# Patient Record
Sex: FEMALE
[De-identification: names, ages, dates, MRNs, and addresses within clinical notes are randomized per-mention and may not be internally consistent; named-entity substitution may affect disease eponyms.]

---

## 2018-04-26 ENCOUNTER — HOSPITAL ENCOUNTER (EMERGENCY)
Dept: HOSPITAL 31 - C.ER | Age: 53
Discharge: HOME | End: 2018-04-26
Payer: MEDICAID

## 2018-04-26 VITALS
HEART RATE: 71 BPM | TEMPERATURE: 97.8 F | SYSTOLIC BLOOD PRESSURE: 175 MMHG | DIASTOLIC BLOOD PRESSURE: 93 MMHG | RESPIRATION RATE: 18 BRPM

## 2018-04-26 VITALS — OXYGEN SATURATION: 96 %

## 2018-04-26 VITALS — BODY MASS INDEX: 35.1 KG/M2

## 2018-04-26 DIAGNOSIS — T78.49XA: Primary | ICD-10-CM
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
## 2018-04-26 PROCEDURE — 96375 TX/PRO/DX INJ NEW DRUG ADDON: CPT

## 2018-04-26 PROCEDURE — 96361 HYDRATE IV INFUSION ADD-ON: CPT

## 2018-04-26 PROCEDURE — 99285 EMERGENCY DEPT VISIT HI MDM: CPT

## 2018-04-26 PROCEDURE — 96374 THER/PROPH/DIAG INJ IV PUSH: CPT

## 2018-04-26 NOTE — C.PDOC
Time Seen by Provider: 04/26/18 15:06


Chief Complaint (Nursing): Allergic Reaction


History Per: Patient


Onset/Duration Of Symptoms: Mins (Just PTA)


Current Symptoms Are (Timing): Still Present


Possible Cause: Food (Tuna)


Associated Symptoms: Skin Rash, Dyspnea, Itching, Redness


Home/EMS Treatment: None


Severity: Moderate


Additional History Per: Prior Records





Past Medical History


Reviewed: Historical Data, Nursing Documentation, Vital Signs


Vital Signs: 


 Last Vital Signs











Temp  97.8 F   04/26/18 19:24


 


Pulse  71   04/26/18 19:24


 


Resp  18   04/26/18 19:24


 


BP  175/93 H  04/26/18 19:24


 


Pulse Ox  100   04/26/18 19:24














- Medical History


PMH: Diabetes, HTN, Hypercholesterolemia


Surgical History: Cholecystectomy


Family History: States: Unknown Family Hx





- Social History


Hx Alcohol Use: No


Hx Substance Use: No





- Immunization History


Hx Tetanus Toxoid Vaccination: No


Hx Influenza Vaccination: No


Hx Pneumococcal Vaccination: No





Review Of Systems


Except As Marked, All Systems Reviewed And Found Negative.


Constitutional: Negative for: Fever


Eyes: Negative for: Conjunctivae Inflammation


ENT: Positive for: Throat Swelling (mild)


Cardiovascular: Negative for: Chest Pain


Respiratory: Negative for: Shortness of Breath


Gastrointestinal: Negative for: Vomiting, Abdominal Pain


Musculoskeletal: Negative for: Neck Pain


Skin: Positive for: Rash


Neurological: Negative for: Weakness, Numbness





Physical Exam





- Physical Exam


Appears: Non-toxic, No Acute Distress


Skin: Warm, Dry, Rash (Urticaria)


Head: Atraumatic, Normacephalic


Eye(s): bilateral: Normal Inspection, PERRL, EOMI


Oral Mucosa: Moist, No Drooling, No Trismus


Tongue: Normal Appearing


Throat: Normal


Neck: Normal ROM, Supple


Cardiovascular: Rhythm Regular


Respiratory: Normal Breath Sounds, No Accessory Muscle Use


Gastrointestinal/Abdominal: Soft, No Tenderness


Back: No CVA Tenderness


Extremity: Normal ROM, No Pedal Edema


Neurological/Psych: Oriented x3, Normal Motor, Normal Sensation





ED Course And Treatment


O2 Sat by Pulse Oximetry: 96


Pulse Ox Interpretation: Normal


Progress Note: Pt feels much better and wants to go home. No throat swelling or 

difficulty breathing.


Reevaluation Time: 19:15


Reassessment Condition: Improved





Progress





- Interventions


Interventions:: Observation, Intravenous fluid





- Medications Administered


Oral: H-2 blocker


Intravenous: Antihistamine (H-1), Corticosteroid





- Data Reviewed


Data Reviewed: Old records





- Patient Status


Patient status: Mostly improved





- Continuity of Care


Discussed patient case with:: Patient, Family-HIPPA compliant, ED Nurse





- Patient Plan


Patient Plan: Discharge, F/U with PCP





Disposition


Counseled Patient/Family Regarding: Studies Performed, Diagnosis, Need For 

Followup, Rx Given





- Disposition


Referrals: 


Jeffery Lu MD [Medical Doctor] - 


Disposition: HOME/ ROUTINE


Disposition Time: 19:34


Condition: IMPROVED


Additional Instructions: 


Avoid eating fish or any seafood until you are cleared by your doctor. Return 

to the ER if you develop trouble breathing or swallowing, throat swelling, 

worsening of symptoms or if you have any other concerns. 


Prescriptions: 


DiphenhydrAMINE [Benadryl] 25 mg PO Q4 PRN #30 cap


 PRN Reason: Allergy Symptoms


predniSONE [predniSONE Tab] 2 tab PO DAILY #6 tab


Instructions:  Anaphylaxis (DC)


Forms:  [employer] ([place]ese)


Print Language: Faroese





- Clinical Impression


Clinical Impression: 


 Allergic reaction

## 2018-07-12 ENCOUNTER — HOSPITAL ENCOUNTER (EMERGENCY)
Dept: HOSPITAL 31 - C.ER | Age: 53
Discharge: HOME | End: 2018-07-12
Payer: MEDICAID

## 2018-07-12 VITALS
SYSTOLIC BLOOD PRESSURE: 183 MMHG | DIASTOLIC BLOOD PRESSURE: 78 MMHG | TEMPERATURE: 98.1 F | HEART RATE: 72 BPM | RESPIRATION RATE: 18 BRPM

## 2018-07-12 VITALS — BODY MASS INDEX: 35.1 KG/M2

## 2018-07-12 VITALS — OXYGEN SATURATION: 96 %

## 2018-07-12 DIAGNOSIS — R04.2: Primary | ICD-10-CM

## 2018-07-12 LAB
ALBUMIN SERPL-MCNC: 3.6 G/DL (ref 3.5–5)
ALBUMIN/GLOB SERPL: 1.1 {RATIO} (ref 1–2.1)
ALT SERPL-CCNC: 19 U/L (ref 9–52)
APTT BLD: 34 SECONDS (ref 21–34)
AST SERPL-CCNC: 19 U/L (ref 14–36)
BASOPHILS # BLD AUTO: 0 K/UL (ref 0–0.2)
BASOPHILS NFR BLD: 0.8 % (ref 0–2)
BUN SERPL-MCNC: 14 MG/DL (ref 7–17)
CALCIUM SERPL-MCNC: 9.3 MG/DL (ref 8.6–10.4)
EOSINOPHIL # BLD AUTO: 0.1 K/UL (ref 0–0.7)
EOSINOPHIL NFR BLD: 1.2 % (ref 0–4)
ERYTHROCYTE [DISTWIDTH] IN BLOOD BY AUTOMATED COUNT: 16.7 % (ref 11.5–14.5)
GFR NON-AFRICAN AMERICAN: > 60
HGB BLD-MCNC: 12.5 G/DL (ref 11–16)
INR PPP: 1
LIPASE: 109 U/L (ref 23–300)
LYMPHOCYTES # BLD AUTO: 1.6 K/UL (ref 1–4.3)
LYMPHOCYTES NFR BLD AUTO: 27.1 % (ref 20–40)
MCH RBC QN AUTO: 26.5 PG (ref 27–31)
MCHC RBC AUTO-ENTMCNC: 32.6 G/DL (ref 33–37)
MCV RBC AUTO: 81.4 FL (ref 81–99)
MONOCYTES # BLD: 0.4 K/UL (ref 0–0.8)
MONOCYTES NFR BLD: 7 % (ref 0–10)
NEUTROPHILS # BLD: 3.8 K/UL (ref 1.8–7)
NEUTROPHILS NFR BLD AUTO: 63.9 % (ref 50–75)
NRBC BLD AUTO-RTO: 0 % (ref 0–2)
PLATELET # BLD: 194 K/UL (ref 130–400)
PMV BLD AUTO: 11.3 FL (ref 7.2–11.7)
PROTHROMBIN TIME: 11.3 SECONDS (ref 9.7–12.2)
RBC # BLD AUTO: 4.71 MIL/UL (ref 3.8–5.2)
WBC # BLD AUTO: 6 K/UL (ref 4.8–10.8)

## 2018-07-12 PROCEDURE — 93005 ELECTROCARDIOGRAM TRACING: CPT

## 2018-07-12 PROCEDURE — 71045 X-RAY EXAM CHEST 1 VIEW: CPT

## 2018-07-12 PROCEDURE — 96374 THER/PROPH/DIAG INJ IV PUSH: CPT

## 2018-07-12 PROCEDURE — 74177 CT ABD & PELVIS W/CONTRAST: CPT

## 2018-07-12 PROCEDURE — 80053 COMPREHEN METABOLIC PANEL: CPT

## 2018-07-12 PROCEDURE — 83690 ASSAY OF LIPASE: CPT

## 2018-07-12 PROCEDURE — 99284 EMERGENCY DEPT VISIT MOD MDM: CPT

## 2018-07-12 PROCEDURE — 85025 COMPLETE CBC W/AUTO DIFF WBC: CPT

## 2018-07-12 PROCEDURE — 85730 THROMBOPLASTIN TIME PARTIAL: CPT

## 2018-07-12 PROCEDURE — 84484 ASSAY OF TROPONIN QUANT: CPT

## 2018-07-12 PROCEDURE — 86850 RBC ANTIBODY SCREEN: CPT

## 2018-07-12 PROCEDURE — 86900 BLOOD TYPING SEROLOGIC ABO: CPT

## 2018-07-12 PROCEDURE — 85610 PROTHROMBIN TIME: CPT

## 2018-07-12 NOTE — C.PDOC
History Of Present Illness


51 y/o female with history of DM and HTN presents to ED with c/o abdominal pain

, chest pain and "coughing/vomiting blood". Patient denies sob, back pain, 

diarrhea, fever, chills or any other complaints at this time. Patient poor 

historian. 


Time Seen by Provider: 18 09:41


Chief Complaint (Nursing): Chest Pain


History Per: Patient


History/Exam Limitations: no limitations


Onset/Duration Of Symptoms: Hrs


Current Symptoms Are (Timing): Still Present





Past Medical History


Reviewed: Historical Data, Nursing Documentation, Vital Signs


Vital Signs: 


 Last Vital Signs











Temp  98.1 F   18 13:56


 


Pulse  72   18 13:56


 


Resp  18   18 13:56


 


BP  183/78 H  18 13:56


 


Pulse Ox  96   18 15:02














- Medical History


PMH: Diabetes, HTN, Hypercholesterolemia


Surgical History: Cholecystectomy


Family History: States: No Known Family Hx





- Social History


Hx Alcohol Use: No


Hx Substance Use: No





- Immunization History


Hx Tetanus Toxoid Vaccination: No


Hx Influenza Vaccination: No


Hx Pneumococcal Vaccination: No





Review Of Systems


Except As Marked, All Systems Reviewed And Found Negative.


Cardiovascular: Positive for: Chest Pain


Respiratory: Positive for: Cough


Gastrointestinal: Positive for: Vomiting, Abdominal Pain





Physical Exam





- Physical Exam


Appears: Non-toxic, No Acute Distress


Skin: Warm, Dry, No Rash


Head: Atraumatic, Normacephalic


Eye(s): bilateral: Normal Inspection


Oral Mucosa: Moist


Neck: Normal ROM, Supple


Cardiovascular: Rhythm Regular


Respiratory: Normal Breath Sounds, No Rales, No Rhonchi, No Wheezing


Gastrointestinal/Abdominal: Soft, No Tenderness, No Guarding, No Rebound


Neurological/Psych: Oriented x3, Normal Speech





ED Course And Treatment





- Laboratory Results


Result Diagrams: 


 18 10:19





 18 10:19


ECG: Interpreted By Me, Viewed By Me


ECG Rhythm: Sinus Rhythm


Rate From EC (BPM)


O2 Sat by Pulse Oximetry: 96 (RA)


Pulse Ox Interpretation: Normal





Medical Decision Making


Medical Decision Making: 





pt intinally endorses abd pain and vomiting blood, later states only mild 

hemoptysis. h/h stable. labs neg. ct abd neg. cxr neg pt offered observation, 

declines. again went bedside with daughter, asking for dc. 





Disposition





- Disposition


Referrals: 


 Service [Outside]


[facility] [Outside]


Disposition: HOME/ ROUTINE


Disposition Time: 01:00


Condition: STABLE


Additional Instructions: 


follow up with you rdoctor/clinic. return to er with worsening symptoms or 

concerns. you may need further diagnositc workup as an outpatient. 


Instructions:  Cough in Adults, Gastrointestinal Bleeding, Coughing up Blood


Forms:  [facility] (English), Work Excuse


Print Language: Welsh





- Clinical Impression


Clinical Impression: 


 Hemoptysis, Abdominal pain








- Scribe Statement


The provider has reviewed the documentation as recorded by the Liliibjovany Olivera





All medical record entries made by the Liliibjovany were at my direction and 

personally dictated by me. I have reviewed the chart and agree that the record 

accurately reflects my personal performance of the history, physical exam, 

medical decision making, and the department course for this patient. I have 

also personally directed, reviewed, and agree with the discharge instructions 

and disposition.

## 2018-07-12 NOTE — RAD
Date of service: 



07/12/2018



PROCEDURE:  CHEST RADIOGRAPH, 1 VIEW



HISTORY:

chest pain



COMPARISON:

None available.



FINDINGS:



LUNGS:

Ominous of the pulmonary vasculature may be secondary to AP technique 

and/or pulmonary vascular congestion.  No focal consolidation.



PLEURA:

No pneumothorax or pleural fluid seen.



CARDIOVASCULAR:

Atherosclerotic aortic calcifications.  Cardiomediastinal silhouette 

at the upper limits of normal in size.



OSSEOUS STRUCTURES:

Degenerative changes.



VISUALIZED UPPER ABDOMEN:

Normal.



OTHER FINDINGS:

None. 



IMPRESSION:

Prominence of the pulmonary vasculature may be secondary to AP 

technique and/or pulmonary vascular congestion. No focal 

consolidation or pleural effusion.

## 2018-07-12 NOTE — CT
Date of service: 



07/12/2018



PROCEDURE:  CT Abdomen and Pelvis with contrast



HISTORY:

upper abd pain



COMPARISON:

None.



TECHNIQUE:

Contrast dose: 100 mL Visipaque 320.



Axial and reformatted coronal and sagittal CT images of the abdomen 

and pelvis were obtained after IV contrast administration.



Radiation dose:



Total exam DLP = 589.58 mGy-cm.



This CT exam was performed using one or more of the following dose 

reduction techniques: Automated exposure control, adjustment of the 

mA and/or kV according to patient size, and/or use of iterative 

reconstruction technique.



FINDINGS:



LOWER THORAX:

Ground-glass opacities noted at the lung bases nonspecific may 

represent mild congestion or atelectasis.



Mild distal esophagus wall thickening. 



LIVER:

No evidence of acute pathology in the liver. 



GALLBLADDER AND BILE DUCTS:

Status post cholecystectomy. No evidence of intrahepatic or 

extrahepatic biliary ductal dilatation. 



PANCREAS:

No CT evidence of pancreatitis. The main pancreatic duct is not 

dilated.



SPLEEN:

Unremarkable. 



ADRENALS:

Unremarkable. No mass. 



KIDNEYS AND URETERS:

Unremarkable. No hydronephrosis. No solid mass. 



VASCULATURE:

Unremarkable. No aortic aneurysm. 



BOWEL:

Unremarkable. No obstruction. No gross mural thickening. 



APPENDIX:

Normal appendix. 



PERITONEUM:

Unremarkable. No free fluid. No free air. 



LYMPH NODES:

Unremarkable. No enlarged lymph nodes. 



BLADDER:

Unremarkable. 



REPRODUCTIVE:

Unremarkable. 



BONES:

No acute fracture. 



OTHER FINDINGS:

None.



IMPRESSION:

No CT evidence of acute pathology in the abdomen and pelvis.

## 2018-07-13 NOTE — CARD
--------------- APPROVED REPORT --------------





Date of service: 07/12/2018



EKG Measurement

Heart [id number]

[id number]

[id number]

[id number]

[id number]



<Conclusion>

Normal sinus rhythm with sinus arrhythmia

Normal ECG